# Patient Record
Sex: FEMALE | Race: WHITE | Employment: FULL TIME | URBAN - METROPOLITAN AREA
[De-identification: names, ages, dates, MRNs, and addresses within clinical notes are randomized per-mention and may not be internally consistent; named-entity substitution may affect disease eponyms.]

---

## 2020-05-26 ENCOUNTER — TELEPHONE (OUTPATIENT)
Dept: ORTHOPEDIC SURGERY | Age: 55
End: 2020-05-26

## 2020-05-26 ENCOUNTER — OFFICE VISIT (OUTPATIENT)
Dept: ORTHOPEDIC SURGERY | Age: 55
End: 2020-05-26
Payer: COMMERCIAL

## 2020-05-26 VITALS — WEIGHT: 253.09 LBS | HEIGHT: 67 IN | BODY MASS INDEX: 39.72 KG/M2

## 2020-05-26 PROCEDURE — 99203 OFFICE O/P NEW LOW 30 MIN: CPT | Performed by: PHYSICIAN ASSISTANT

## 2020-05-26 RX ORDER — METHYLPREDNISOLONE 4 MG/1
4 TABLET ORAL SEE ADMIN INSTRUCTIONS
Qty: 1 KIT | Refills: 0 | Status: SHIPPED | OUTPATIENT
Start: 2020-05-26 | End: 2020-06-01

## 2020-05-26 ASSESSMENT — ENCOUNTER SYMPTOMS
EYES NEGATIVE: 1
NAUSEA: 0
VOMITING: 0
COLOR CHANGE: 0
COUGH: 0
RHINORRHEA: 0

## 2020-05-26 NOTE — PROGRESS NOTES
Patient ID: Mars Gooden is a 53 y.o. female. Chief Complaint   Patient presents with    New Patient     Rt knee pain, started 5/21/2020. HPI  Ms. Samy Galeas is a 52 y/o female who presents for evaluation of right knee pain. Patient states her pain has been present for approximately 4 weeks however on Thursday, 5/21/2020 she can \"move the knee the wrong way \" and had a sudden increase of pain. Since then she has continued to have severe pain. Pain is most severe to the medial aspect of this right knee and is made significantly worse with any weightbearing or walking. Patient states since Thursday she has pretty much been sitting whenever she can due to pain with weightbearing. Patient denies any knee joint warmth, redness, fever or chills. Patient has tried both Advil and Tylenol without much relief. She has also tried ice rest and elevation which is provided mild relief of her pain. Past Medical History:   Diagnosis Date    Anxiety     Hypertension     Hypothyroid     Hypothyroid      No past surgical history on file. Family History   Problem Relation Age of Onset    Diabetes Mother     Cancer Mother     Heart Disease Father      Social History     Occupational History    Not on file   Tobacco Use    Smoking status: Former Smoker    Smokeless tobacco: Never Used   Substance and Sexual Activity    Alcohol use: No    Drug use: No    Sexual activity: Not on file        Review of Systems   Constitutional: Negative for chills and fever. HENT: Negative for congestion and rhinorrhea. Eyes: Negative. Respiratory: Negative for cough. Cardiovascular: Negative for chest pain and leg swelling. Gastrointestinal: Negative for nausea and vomiting. Musculoskeletal: Positive for arthralgias (Right knee). Skin: Negative for color change and rash. Neurological: Negative for dizziness and numbness.             Physical Exam  Constitutional:       Appearance: She is well-developed. HENT:      Head: Normocephalic and atraumatic. Neck:      Musculoskeletal: Normal range of motion and neck supple. Cardiovascular:      Rate and Rhythm: Normal rate. Pulmonary:      Effort: Pulmonary effort is normal.   Abdominal:      Palpations: Abdomen is soft. Skin:     General: Skin is warm and dry. Findings: No rash. Neurological:      Mental Status: She is alert and oriented to person, place, and time. Psychiatric:         Behavior: Behavior normal.     Knee: Bilateral    Skin: warm and dry, no rash or erythema  Vasculature: 2+ pedal pulses bilaterally  Neuro: Sensation grossly intact to light touch diffusely  Alignment: Normal  Tenderness: Mild tenderness to the medial aspect of the knee along the medial joint line. ROM: (Degrees)    Right   A P   Left   A P    Extension  0    Extension  0   Flexion   120    Flexion   125      Crepitation  Yes    Crepitation  Yes      Muscle strength:    Right       Left    Flexion   5    Flexion   5  Extension  5    Extension  5  SLR   5    SLR   5    Extensor lag   n    Extensor lag  n      Special testing:    Right          Left    y    Pain with deep knee flexion   n  n    Patellar grind     n  n    Patellar apprehension    n  n    Patellar glide     n    n    Lachman     n  n    Anterior drawer    n  n    Pivot shift     n  n    Posterior drawer    n  n    Dial test     n  n    Posterolateral drawer    n  n    Posterior Sag     n  Pain w/ valgus stress  MCL      n  But no laxity  n    LCL      n    y    Medial joint line tenderness   n  n    Lateral joint line tenderness   n  n    Appley's     n  n    Mcmurrey's     n          Assessment:     1. Acute pain of right knee      MDM: Differential includes flare of osteoarthritis, collateral ligament injury, cruciate ligament injury, meniscal injury, septic arthritis and fracture.   Given medial joint line pain and pain with valgus stress consider possible sprain of the MCL ligament

## 2020-06-01 NOTE — TELEPHONE ENCOUNTER
I do not see any forms done by our office, I see we wrote a letter. Will extend letter. Left message for patient that they will have to come  letter.

## 2020-06-09 ENCOUNTER — OFFICE VISIT (OUTPATIENT)
Dept: ORTHOPEDIC SURGERY | Age: 55
End: 2020-06-09
Payer: COMMERCIAL

## 2020-06-09 VITALS — HEIGHT: 67 IN | BODY MASS INDEX: 41.28 KG/M2 | WEIGHT: 263 LBS

## 2020-06-09 PROCEDURE — 99213 OFFICE O/P EST LOW 20 MIN: CPT | Performed by: PHYSICIAN ASSISTANT

## 2020-06-09 RX ORDER — DICLOFENAC SODIUM 75 MG/1
75 TABLET, DELAYED RELEASE ORAL 2 TIMES DAILY WITH MEALS
Qty: 28 TABLET | Refills: 0 | Status: SHIPPED | OUTPATIENT
Start: 2020-06-09 | End: 2020-06-30 | Stop reason: ALTCHOICE

## 2020-06-09 RX ORDER — ALPRAZOLAM 0.5 MG/1
TABLET ORAL PRN
COMMUNITY
Start: 2010-01-11

## 2020-06-09 ASSESSMENT — ENCOUNTER SYMPTOMS
RHINORRHEA: 0
COUGH: 0
NAUSEA: 0
COLOR CHANGE: 0
VOMITING: 0
EYES NEGATIVE: 1

## 2020-06-09 NOTE — PROGRESS NOTES
Patient ID: Stephanie Srinivasan is a 53 y.o. female. Chief Complaint   Patient presents with    Knee Pain     Right knee pain        HPI  Ms. Fara Clifford afebrile female returns today for reevaluation of right knee pain. Initially evaluated by me 2 weeks ago found to have pain consistent with mild MCL sprain without tear was placed in a hinged knee brace, given Medrol Dosepak and instructed to follow-up today for reevaluation. She states overall her pain has improved however she still feels that the knee is unstable with certain movements. She also has great difficulty sleeping at night due to pain when the medial aspect of the knee comes in contact with a blanket or her other leg. She states she is now able to walk short distances however she does feel unstable. She continues to ice and elevate the knee regularly. She denies any new injury, joint warmth, redness, fever or chills. Past Medical History:   Diagnosis Date    Anxiety     Hypertension     Hypothyroid     Hypothyroid      No past surgical history on file. Family History   Problem Relation Age of Onset    Diabetes Mother     Cancer Mother     Heart Disease Father      Social History     Occupational History    Not on file   Tobacco Use    Smoking status: Former Smoker    Smokeless tobacco: Never Used   Substance and Sexual Activity    Alcohol use: No    Drug use: No    Sexual activity: Not on file        Review of Systems   Constitutional: Negative for chills and fever. HENT: Negative for congestion and rhinorrhea. Eyes: Negative. Respiratory: Negative for cough. Cardiovascular: Negative for chest pain and leg swelling. Gastrointestinal: Negative for nausea and vomiting. Musculoskeletal: Positive for arthralgias (Right knee). Skin: Negative for color change and rash. Neurological: Negative for dizziness and numbness. Physical Exam  Constitutional:       Appearance: She is well-developed.    HENT: Head: Normocephalic and atraumatic. Neck:      Musculoskeletal: Normal range of motion and neck supple. Cardiovascular:      Rate and Rhythm: Normal rate. Pulmonary:      Effort: Pulmonary effort is normal.   Abdominal:      Palpations: Abdomen is soft. Skin:     General: Skin is warm and dry. Findings: No rash. Neurological:      Mental Status: She is alert and oriented to person, place, and time. Psychiatric:         Behavior: Behavior normal.     Knee: Bilateral    Skin: warm and dry, no rash or erythema  Vasculature: 2+ pedal pulses bilaterally  Neuro: Sensation grossly intact to light touch diffusely  Alignment: Normal  Tenderness: Mild tenderness over the MCL insertion. No tenderness directly over the medial joint line. No tenderness to the lateral joint. No tenderness to the anterior knee. ROM: (Degrees)    Right   A P   Left   A P    Extension  0    Extension  0   Flexion   105 115   Flexion   120      Crepitation  No    Crepitation  No      Muscle strength:    Right       Left    Flexion   5    Flexion   5  Extension  5    Extension  5  SLR   5    SLR   5    Extensor lag   n    Extensor lag  n      Special testing:    Right          Left    y    Pain with deep knee flexion   n  n    Patellar grind     n  n    Patellar apprehension    n  n    Patellar glide     n    n    Lachman     n  n    Anterior drawer    n  n    Pivot shift     n  n    Posterior drawer    n  n    Dial test     n  n    Posterolateral drawer    n  n    Posterior Sag     n  Pain w/ Valgus  Stress- no laxity  MCL      n  n    LCL      n    n    Medial joint line tenderness   n  n    Lateral joint line tenderness   n  n    Appley's     n          Assessment:     Encounter Diagnosis   Name Primary?     Sprain of medial collateral ligament of left knee, subsequent encounter Yes         Plan:     Due to patient's continued pain despite conservative management recommend MRI to rule out MCL versus medial meniscus

## 2020-06-09 NOTE — LETTER
[facility]  [address] [place] [id number]  Phone: [phone]  Fax: [phone]    Matt Zambrano        June 9, 2020     Patient: Conrado Dave   YOB: 1965   Date of Visit: 6/9/2020       To Whom It May Concern: It is my medical opinion that Conrado Dave will be off work from 5/24/2020 until released by provider. If you have any questions or concerns, please don't hesitate to call.     Sincerely,        PAPI Zambrano

## 2020-06-22 ENCOUNTER — HOSPITAL ENCOUNTER (OUTPATIENT)
Dept: MRI IMAGING | Age: 55
Discharge: HOME | End: 2020-06-24
Payer: COMMERCIAL

## 2020-06-22 PROCEDURE — 73721 MRI JNT OF LWR EXTRE W/O DYE: CPT

## 2020-06-23 ENCOUNTER — TELEPHONE (OUTPATIENT)
Dept: ORTHOPEDIC SURGERY | Age: 55
End: 2020-06-23

## 2020-06-25 ENCOUNTER — OFFICE VISIT (OUTPATIENT)
Dept: ORTHOPEDIC SURGERY | Age: 55
End: 2020-06-25
Payer: COMMERCIAL

## 2020-06-25 PROCEDURE — 99213 OFFICE O/P EST LOW 20 MIN: CPT | Performed by: ORTHOPAEDIC SURGERY

## 2020-06-25 RX ORDER — TRAMADOL HYDROCHLORIDE 50 MG/1
50 TABLET ORAL EVERY 6 HOURS PRN
Qty: 40 TABLET | Refills: 0 | Status: CANCELLED | OUTPATIENT
Start: 2020-06-25 | End: 2020-07-05

## 2020-06-25 NOTE — PROGRESS NOTES
Tashia El M.D.            [address] [place] [address], [address] [facility], [address] [facility]           Dept Phone: [phone]           Dept Fax:  [address]           [place]          Dept Phone: [phone]           Dept Fax:  [phone]      Chief Compliant:  Chief Complaint   Patient presents with    Pain     Rt MMT / MRI        History of Present Illness: This is a 53 y.o. female who presents to the clinic today for evaluation of right knee pain. Patient has been seen by our PA, Noelle Coulter with MRI of the right knee for continued pain. The MRI did confirm a medial meniscus tear with grade 1 MCL sprain as well as severe patellofemoral chondromalacia. She reports that the pain started when she went on a bike ride and the pain worsened as she started to go back to work. She notes that she did have a twisting turning motion that causes her sharp pain. She reports that she is very limited in her activity, she has trouble getting around. She notes walking has become very difficult due to pain. She is in a wheelchair today as her activities are limited. She also wears a knee brace on the right side. Review of Systems   Constitutional: Negative for fever, chills, sweats. Eyes: Negative for changes in vision, or pain. HENT: Negative for ear ache, epistaxis, or sore throat. Respiratory/Cardio: Negative for Chest pain, palpitations, SOB, or cough. Gastrointestinal: Negative for abdominal pain, N/V/D. Genitourinary: Negative for dysuria, frequency, urgency, or hematuria. Neurological: Negative for headache, numbness, or weakness. Integumentary: Negative for rash, itching, laceration, or abrasion. Musculoskeletal: Positive for Pain (Rt MMT / MRI)       Physical Exam:  Constitutional: Patient is oriented to person, place, and time.  Patient Used   Substance and Sexual Activity    Alcohol use: No    Drug use: No    Sexual activity: Not on file   Lifestyle    Physical activity     Days per week: Not on file     Minutes per session: Not on file    Stress: Not on file   Relationships    Social connections     Talks on phone: Not on file     Gets together: Not on file     Attends Sikhism service: Not on file     Active member of club or organization: Not on file     Attends meetings of clubs or organizations: Not on file     Relationship status: Not on file    Intimate partner violence     Fear of current or ex partner: Not on file     Emotionally abused: Not on file     Physically abused: Not on file     Forced sexual activity: Not on file   Other Topics Concern    Not on file   Social History Narrative    Not on file     Past Medical History:   Diagnosis Date    Anxiety     Hypertension     Hypothyroid     Hypothyroid      No past surgical history on file. Family History   Problem Relation Age of Onset    Diabetes Mother     Cancer Mother     Heart Disease Father           Scribe Attestation:  By signing my name below, I, Kyleigh Zarate, attest that this documentation has been prepared under the direction and in the presence of Dr. Waylon Hall. Electronically signed: Leif Cristobal, 6/25/20 \    Please note that this chart was generated using voice recognition Dragon dictation software. Although every effort was made to ensure the accuracy of this automated transcription, some errors in transcription may have occurred.

## 2020-06-26 ENCOUNTER — TELEPHONE (OUTPATIENT)
Dept: ORTHOPEDIC SURGERY | Age: 55
End: 2020-06-26

## 2020-06-26 RX ORDER — TRAMADOL HYDROCHLORIDE 50 MG/1
50 TABLET ORAL EVERY 6 HOURS PRN
Qty: 40 TABLET | Refills: 0 | Status: SHIPPED | OUTPATIENT
Start: 2020-06-26 | End: 2020-07-22 | Stop reason: SDUPTHER

## 2020-06-26 NOTE — TELEPHONE ENCOUNTER
Knee scope scheduled for 7/14. She is wanting to know if you might give her a pain med to cover her. Want to give her anything? Tramadol maybe?

## 2020-06-30 ENCOUNTER — HOSPITAL ENCOUNTER (OUTPATIENT)
Dept: PREADMISSION TESTING | Age: 55
Discharge: HOME | End: 2020-07-04
Payer: COMMERCIAL

## 2020-06-30 VITALS
SYSTOLIC BLOOD PRESSURE: 128 MMHG | RESPIRATION RATE: 16 BRPM | HEIGHT: 67 IN | WEIGHT: 255 LBS | HEART RATE: 74 BPM | DIASTOLIC BLOOD PRESSURE: 71 MMHG | TEMPERATURE: 97.5 F | BODY MASS INDEX: 40.02 KG/M2 | OXYGEN SATURATION: 97 %

## 2020-06-30 LAB
ABSOLUTE EOS #: 0.2 K/UL (ref 0–0.4)
ABSOLUTE IMMATURE GRANULOCYTE: NORMAL K/UL (ref 0–0.3)
ABSOLUTE LYMPH #: 1.4 K/UL (ref 1–4.8)
ABSOLUTE MONO #: 0.4 K/UL (ref 0.1–1.3)
ANION GAP SERPL CALCULATED.3IONS-SCNC: 13 MMOL/L (ref 9–17)
BASOPHILS # BLD: 1 % (ref 0–2)
BASOPHILS ABSOLUTE: 0 K/UL (ref 0–0.2)
BUN BLDV-MCNC: 15 MG/DL (ref 6–20)
BUN/CREAT BLD: ABNORMAL (ref 9–20)
CALCIUM SERPL-MCNC: 9.4 MG/DL (ref 8.6–10.4)
CHLORIDE BLD-SCNC: 103 MMOL/L (ref 98–107)
CO2: 25 MMOL/L (ref 20–31)
CREAT SERPL-MCNC: 0.7 MG/DL (ref 0.5–0.9)
DIFFERENTIAL TYPE: NORMAL
EOSINOPHILS RELATIVE PERCENT: 3 % (ref 0–4)
GFR AFRICAN AMERICAN: >60 ML/MIN
GFR NON-AFRICAN AMERICAN: >60 ML/MIN
GFR SERPL CREATININE-BSD FRML MDRD: ABNORMAL ML/MIN/{1.73_M2}
GFR SERPL CREATININE-BSD FRML MDRD: ABNORMAL ML/MIN/{1.73_M2}
GLUCOSE BLD-MCNC: 143 MG/DL (ref 70–99)
HCT VFR BLD CALC: 41.3 % (ref 36–46)
HEMOGLOBIN: 14 G/DL (ref 12–16)
IMMATURE GRANULOCYTES: NORMAL %
LYMPHOCYTES # BLD: 24 % (ref 24–44)
MCH RBC QN AUTO: 30.1 PG (ref 26–34)
MCHC RBC AUTO-ENTMCNC: 33.9 G/DL (ref 31–37)
MCV RBC AUTO: 88.8 FL (ref 80–100)
MONOCYTES # BLD: 6 % (ref 1–7)
NRBC AUTOMATED: NORMAL PER 100 WBC
PDW BLD-RTO: 12.6 % (ref 11.5–14.9)
PLATELET # BLD: 262 K/UL (ref 150–450)
PLATELET ESTIMATE: NORMAL
PMV BLD AUTO: 8.4 FL (ref 6–12)
POTASSIUM SERPL-SCNC: 4.2 MMOL/L (ref 3.7–5.3)
RBC # BLD: 4.65 M/UL (ref 4–5.2)
RBC # BLD: NORMAL 10*6/UL
SEG NEUTROPHILS: 66 % (ref 36–66)
SEGMENTED NEUTROPHILS ABSOLUTE COUNT: 4 K/UL (ref 1.3–9.1)
SODIUM BLD-SCNC: 141 MMOL/L (ref 135–144)
WBC # BLD: 6.1 K/UL (ref 3.5–11)
WBC # BLD: NORMAL 10*3/UL

## 2020-06-30 PROCEDURE — 80048 BASIC METABOLIC PNL TOTAL CA: CPT

## 2020-06-30 PROCEDURE — 85025 COMPLETE CBC W/AUTO DIFF WBC: CPT

## 2020-06-30 NOTE — H&P
HISTORY and [facility]       NAME:  Gilbert Rodríguez  MRN: [id number]   YOB: 1965   Date: 6/30/2020   Age: 53 y.o. Gender: female       COMPLAINT AND PRESENT HISTORY:     Gilbert Rodríguez is 53 y.o.,  female, undergoing preadmission testing for right knee meniscus tear with scheduled right knee arthroscopy partial medial meniscectomy. Symptoms started 1 month ago. Pt was riding bike and started having mild pain in right knee. Got up to bathroom shortly after right knee initially began hurting, twisted and felt sudden, severe pain. Pt unable to walk at that time due to pain. Pt is able to walk a few steps now with assistance from walker at home. Wearing a brace today. In wheelchair today. Pt c/o right knee pain. Describes pain as intermittent sharp pain. Rating pain 7-10/10. Takes Voltaren, tramadol and advil with moderate relief. Standing makes pain worse. Ice, rest and elevation helps alleviate pain. Pain is worse at night, wakes her from sleep. Worse first thing in the morning as well. The knee does not buckle, give way under the patient. No recent falls or trauma. No redness, swelling or rashes. No Hx of MRSA infections in the past.    PMHx HTN, well controlled with losartan. Denies any recent headaches, dizziness, chest pain/pressure, palpitations, SOB, recent URI, fever or chills. PAST MEDICAL HISTORY     Past Medical History:   Diagnosis Date    Anxiety     Hypertension     Hypothyroid     Hypothyroid      Pt denies any history of Diabetes mellitus type 2, stroke, heart disease, COPD, Asthma, GERD, HLD, Cancer, Seizures, Kidney Disease, Hepatitis, TB, Psychiatric Disorders or Substance abuse.     SURGICAL HISTORY       Past Surgical History:   Procedure Laterality Date    COLONOSCOPY      TONSILLECTOMY      WISDOM TOOTH EXTRACTION         FAMILY HISTORY       Family History   Problem Relation Age of Onset    Diabetes Mother     Cancer Mother    [employer] Heart Disease Father        SOCIAL HISTORY       Social History     Socioeconomic History    Marital status:      Spouse name: None    Number of children: None    Years of education: None    Highest education level: None   Occupational History    None   Social Needs    Financial resource strain: None    Food insecurity     Worry: None     Inability: None    Transportation needs     Medical: None     Non-medical: None   Tobacco Use    Smoking status: Former Smoker     Last attempt to quit: 1992     Years since quittin.0    Smokeless tobacco: Never Used   Substance and Sexual Activity    Alcohol use: No    Drug use: No    Sexual activity: None   Lifestyle    Physical activity     Days per week: None     Minutes per session: None    Stress: None   Relationships    Social connections     Talks on phone: None     Gets together: None     Attends Lutheran service: None     Active member of club or organization: None     Attends meetings of clubs or organizations: None     Relationship status: None    Intimate partner violence     Fear of current or ex partner: None     Emotionally abused: None     Physically abused: None     Forced sexual activity: None   Other Topics Concern    None   Social History Narrative    None        REVIEW OF SYSTEMS      Allergies   Allergen Reactions    Pcn [Penicillins]     Penicillins Other (See Comments)     Pt says mom said PT passed out       Current Outpatient Medications on File Prior to Encounter   Medication Sig Dispense Refill    traMADol (ULTRAM) 50 MG tablet Take 1 tablet by mouth every 6 hours as needed for Pain for up to 10 days. Intended supply: 5 days. Take lowest dose possible to manage pain 40 tablet 0    ALPRAZolam (XANAX) 0.5 MG tablet Take by mouth as needed.       levothyroxine (SYNTHROID) 75 MCG tablet Take 1 tablet by mouth daily 90 tablet 3    losartan (COZAAR) 100 MG tablet Take 1 tablet by mouth daily (Patient taking differently: Take 100 mg by mouth nightly ) 90 tablet 3    PARoxetine (PAXIL) 20 MG tablet Take 1 tablet by mouth every morning Indications: 1 qd 90 tablet 3     No current facility-administered medications on file prior to encounter. General health:  Fairly good. No fever or chills. Skin:  No itching, redness or rash. HEENT:  No headache, epistaxis or sore throat. Neck:  No pain, stiffness or masses. Cardiovascular/Respiratory system:  No chest pain, palpitation or shortness of breath. Gastrointestinal tract: No abdominal pain, Dysphagia, nausea, vomiting, diarrhea or constipation. Genitourinary:  No burning on micturition. No hesitancy, urgency, frequency or discoloration of urine. Locomotor:  See HPI. Neuropsychiatric:  No referable complaints. GENERAL PHYSICAL EXAM:     Vitals: /71   Pulse 74   Temp 97.5 °F (36.4 °C) (Infrared)   Resp 16   Ht 5' 7\" (1.702 m)   Wt 255 lb (115.7 kg)   SpO2 97%   BMI 39.94 kg/m²  Body mass index is 39.94 kg/m². GENERAL APPEARANCE:   [place] Ever is 53 y.o.,  female, moderately obese, nourished, conscious, alert. Does not appear to be in any distress or pain at this time. SKIN:  Warm, dry, no cyanosis or jaundice. HEAD:  Normocephalic, atraumatic. EYES:  Pupils equal, reactive to light. EARS:  No discharge, no marked hearing loss. NOSE:  No rhinorrhea, epistaxis or septal deformity. THROAT:  Mucus membranes moist, no erythema or exudate. NECK:  No stiffness, trachea central.  No palpable masses or L.N.                 CHEST:  Symmetrical and equal on expansion. HEART:  RRR S1 > S2. No audible murmurs or gallops.                  LUNGS:  Equal on expansion, normal breath sounds. No wheezing, rhonchi or rales. ABDOMEN:  NABS x 4 quads. Soft on palpation. No localized tenderness. No guarding or rigidity. LYMPHATICS:  No palpable cervical lymphadenopathy. LOCOMOTOR, BACK AND SPINE:  No tenderness or deformities. EXTREMITIES:  Tenderness on palpation of the right knee joint space worse on the medial aspect. Symmetrical, no pretibial edema. No calf tenderness. No discoloration or ulcerations. NEUROLOGIC:  The patient is conscious, alert, oriented. Speech clear, no facial drooping. No apparent focal sensory or motor deficits.                                                                                      PROVISIONAL DIAGNOSES / SURGERY:      MENISCUS TEAR    KNEE ARTHROSCOPY PARTIAL MEDIAL MENISECECTOMY    Patient Active Problem List    Diagnosis Date Noted    Hyperlipidemia 12/01/2014    Hypertension     Hypothyroid     Anxiety            ANGELLA Salas CNP on 6/30/2020 at 10:29 AM

## 2020-06-30 NOTE — FLOWSHEET NOTE
Reviewed patients history and recent EKG with Dr. Letha Dunbar, anesthesia.   No clearance required per Dr. Jose Hale.

## 2020-06-30 NOTE — H&P (VIEW-ONLY)
HISTORY and [id number]       NAME:  Imani Monique  MRN: [id number]   YOB: 1965   Date: 6/30/2020   Age: 53 y.o. Gender: female       COMPLAINT AND PRESENT HISTORY:     Imani Monique is 53 y.o.,  female, undergoing preadmission testing for right knee meniscus tear with scheduled right knee arthroscopy partial medial meniscectomy. Symptoms started 1 month ago. Pt was riding bike and started having mild pain in right knee. Got up to bathroom shortly after right knee initially began hurting, twisted and felt sudden, severe pain. Pt unable to walk at that time due to pain. Pt is able to walk a few steps now with assistance from walker at home. Wearing a brace today. In wheelchair today. Pt c/o right knee pain. Describes pain as intermittent sharp pain. Rating pain 7-10/10. Takes Voltaren, tramadol and advil with moderate relief. Standing makes pain worse. Ice, rest and elevation helps alleviate pain. Pain is worse at night, wakes her from sleep. Worse first thing in the morning as well. The knee does not buckle, give way under the patient. No recent falls or trauma. No redness, swelling or rashes. No Hx of MRSA infections in the past.    PMHx HTN, well controlled with losartan. Denies any recent headaches, dizziness, chest pain/pressure, palpitations, SOB, recent URI, fever or chills. PAST MEDICAL HISTORY     Past Medical History:   Diagnosis Date    Anxiety     Hypertension     Hypothyroid     Hypothyroid      Pt denies any history of Diabetes mellitus type 2, stroke, heart disease, COPD, Asthma, GERD, HLD, Cancer, Seizures, Kidney Disease, Hepatitis, TB, Psychiatric Disorders or Substance abuse.     SURGICAL HISTORY       Past Surgical History:   Procedure Laterality Date    COLONOSCOPY      TONSILLECTOMY      WISDOM TOOTH EXTRACTION         FAMILY HISTORY       Family History   Problem Relation Age of Onset    Diabetes Mother     Cancer Mother    Edy Piper Heart Disease Father        SOCIAL HISTORY       Social History     Socioeconomic History    Marital status:      Spouse name: None    Number of children: None    Years of education: None    Highest education level: None   Occupational History    None   Social Needs    Financial resource strain: None    Food insecurity     Worry: None     Inability: None    Transportation needs     Medical: None     Non-medical: None   Tobacco Use    Smoking status: Former Smoker     Last attempt to quit: 1992     Years since quittin.0    Smokeless tobacco: Never Used   Substance and Sexual Activity    Alcohol use: No    Drug use: No    Sexual activity: None   Lifestyle    Physical activity     Days per week: None     Minutes per session: None    Stress: None   Relationships    Social connections     Talks on phone: None     Gets together: None     Attends Scientologist service: None     Active member of club or organization: None     Attends meetings of clubs or organizations: None     Relationship status: None    Intimate partner violence     Fear of current or ex partner: None     Emotionally abused: None     Physically abused: None     Forced sexual activity: None   Other Topics Concern    None   Social History Narrative    None        REVIEW OF SYSTEMS      Allergies   Allergen Reactions    Pcn [Penicillins]     Penicillins Other (See Comments)     Pt says mom said PT passed out       Current Outpatient Medications on File Prior to Encounter   Medication Sig Dispense Refill    traMADol (ULTRAM) 50 MG tablet Take 1 tablet by mouth every 6 hours as needed for Pain for up to 10 days. Intended supply: 5 days. Take lowest dose possible to manage pain 40 tablet 0    ALPRAZolam (XANAX) 0.5 MG tablet Take by mouth as needed.       levothyroxine (SYNTHROID) 75 MCG tablet Take 1 tablet by mouth daily 90 tablet 3    losartan (COZAAR) 100 MG tablet Take 1 tablet by mouth daily (Patient taking differently: Take 100 mg by mouth nightly ) 90 tablet 3    PARoxetine (PAXIL) 20 MG tablet Take 1 tablet by mouth every morning Indications: 1 qd 90 tablet 3     No current facility-administered medications on file prior to encounter. General health:  Fairly good. No fever or chills. Skin:  No itching, redness or rash. HEENT:  No headache, epistaxis or sore throat. Neck:  No pain, stiffness or masses. Cardiovascular/Respiratory system:  No chest pain, palpitation or shortness of breath. Gastrointestinal tract: No abdominal pain, Dysphagia, nausea, vomiting, diarrhea or constipation. Genitourinary:  No burning on micturition. No hesitancy, urgency, frequency or discoloration of urine. Locomotor:  See HPI. Neuropsychiatric:  No referable complaints. GENERAL PHYSICAL EXAM:     Vitals: /71   Pulse 74   Temp 97.5 °F (36.4 °C) (Infrared)   Resp 16   Ht 5' 7\" (1.702 m)   Wt 255 lb (115.7 kg)   SpO2 97%   BMI 39.94 kg/m²  Body mass index is 39.94 kg/m². GENERAL APPEARANCE:   Heike Wright is 53 y.o.,  female, moderately obese, nourished, conscious, alert. Does not appear to be in any distress or pain at this time. SKIN:  Warm, dry, no cyanosis or jaundice. HEAD:  Normocephalic, atraumatic. EYES:  Pupils equal, reactive to light. EARS:  No discharge, no marked hearing loss. NOSE:  No rhinorrhea, epistaxis or septal deformity. THROAT:  Mucus membranes moist, no erythema or exudate. NECK:  No stiffness, trachea central.  No palpable masses or L.N.                 CHEST:  Symmetrical and equal on expansion. HEART:  RRR S1 > S2. No audible murmurs or gallops.                  LUNGS:  Equal on expansion, normal breath sounds. No wheezing, rhonchi or rales. ABDOMEN:  NABS x 4 quads. Soft on palpation. No localized tenderness. No guarding or rigidity. LYMPHATICS:  No palpable cervical lymphadenopathy. LOCOMOTOR, BACK AND SPINE:  No tenderness or deformities. EXTREMITIES:  Tenderness on palpation of the right knee joint space worse on the medial aspect. Symmetrical, no pretibial edema. No calf tenderness. No discoloration or ulcerations. NEUROLOGIC:  The patient is conscious, alert, oriented. Speech clear, no facial drooping. No apparent focal sensory or motor deficits.                                                                                      PROVISIONAL DIAGNOSES / SURGERY:      MENISCUS TEAR    KNEE ARTHROSCOPY PARTIAL MEDIAL MENISECECTOMY    Patient Active Problem List    Diagnosis Date Noted    Hyperlipidemia 12/01/2014    Hypertension     Hypothyroid     Anxiety            ANGELLA Salas CNP on 6/30/2020 at 10:29 AM

## 2020-07-01 ENCOUNTER — ANESTHESIA EVENT (OUTPATIENT)
Dept: OPERATING ROOM | Age: 55
End: 2020-07-01
Payer: COMMERCIAL

## 2020-07-01 ENCOUNTER — TELEPHONE (OUTPATIENT)
Dept: ORTHOPEDIC SURGERY | Age: 55
End: 2020-07-01

## 2020-07-01 LAB
EKG ATRIAL RATE: 67 BPM
EKG P AXIS: 55 DEGREES
EKG P-R INTERVAL: 164 MS
EKG Q-T INTERVAL: 402 MS
EKG QRS DURATION: 96 MS
EKG QTC CALCULATION (BAZETT): 424 MS
EKG R AXIS: 64 DEGREES
EKG T AXIS: 34 DEGREES
EKG VENTRICULAR RATE: 67 BPM

## 2020-07-01 RX ORDER — SODIUM CHLORIDE 0.9 % (FLUSH) 0.9 %
10 SYRINGE (ML) INJECTION PRN
Status: CANCELLED | OUTPATIENT
Start: 2020-07-01

## 2020-07-01 RX ORDER — SODIUM CHLORIDE, SODIUM LACTATE, POTASSIUM CHLORIDE, CALCIUM CHLORIDE 600; 310; 30; 20 MG/100ML; MG/100ML; MG/100ML; MG/100ML
INJECTION, SOLUTION INTRAVENOUS CONTINUOUS
Status: CANCELLED | OUTPATIENT
Start: 2020-07-01

## 2020-07-01 RX ORDER — SODIUM CHLORIDE 0.9 % (FLUSH) 0.9 %
10 SYRINGE (ML) INJECTION EVERY 12 HOURS SCHEDULED
Status: CANCELLED | OUTPATIENT
Start: 2020-07-01

## 2020-07-01 RX ORDER — LIDOCAINE HYDROCHLORIDE 10 MG/ML
1 INJECTION, SOLUTION EPIDURAL; INFILTRATION; INTRACAUDAL; PERINEURAL
Status: CANCELLED | OUTPATIENT
Start: 2020-07-01 | End: 2020-07-01

## 2020-07-01 NOTE — TELEPHONE ENCOUNTER
Patient's employer is calling to see if we extended the patient's time off work. The paperwork we filled out for return to work of 6/24/20. I see she is scheduled for surgery 7/14/20. Are you keeping the patient off until the surgery? Her employer is sending over more paperwork to be filled out, if we are extending her.

## 2020-07-10 ENCOUNTER — HOSPITAL ENCOUNTER (OUTPATIENT)
Dept: PREADMISSION TESTING | Age: 55
Setting detail: SPECIMEN
Discharge: HOME | End: 2020-07-14
Payer: COMMERCIAL

## 2020-07-10 PROCEDURE — U0004 COV-19 TEST NON-CDC HGH THRU: HCPCS

## 2020-07-11 LAB
SARS-COV-2, PCR: NOT DETECTED
SARS-COV-2, RAPID: NORMAL
SARS-COV-2: NORMAL
SOURCE: NORMAL

## 2020-07-12 ENCOUNTER — TELEPHONE (OUTPATIENT)
Dept: PRIMARY CARE CLINIC | Age: 55
End: 2020-07-12

## 2020-07-14 ENCOUNTER — ANESTHESIA (OUTPATIENT)
Dept: OPERATING ROOM | Age: 55
End: 2020-07-14
Payer: COMMERCIAL

## 2020-07-14 ENCOUNTER — HOSPITAL ENCOUNTER (OUTPATIENT)
Age: 55
Setting detail: OUTPATIENT SURGERY
Discharge: HOME | End: 2020-07-14
Attending: ORTHOPAEDIC SURGERY | Admitting: ORTHOPAEDIC SURGERY
Payer: COMMERCIAL

## 2020-07-14 VITALS
SYSTOLIC BLOOD PRESSURE: 130 MMHG | OXYGEN SATURATION: 97 % | TEMPERATURE: 97.7 F | BODY MASS INDEX: 39.71 KG/M2 | HEART RATE: 77 BPM | HEIGHT: 67 IN | RESPIRATION RATE: 16 BRPM | WEIGHT: 253 LBS | DIASTOLIC BLOOD PRESSURE: 79 MMHG

## 2020-07-14 VITALS — TEMPERATURE: 96.6 F | SYSTOLIC BLOOD PRESSURE: 113 MMHG | OXYGEN SATURATION: 96 % | DIASTOLIC BLOOD PRESSURE: 57 MMHG

## 2020-07-14 PROCEDURE — 6360000002 HC RX W HCPCS: Performed by: ORTHOPAEDIC SURGERY

## 2020-07-14 PROCEDURE — 2500000003 HC RX 250 WO HCPCS: Performed by: NURSE ANESTHETIST, CERTIFIED REGISTERED

## 2020-07-14 PROCEDURE — 7100000010 HC PHASE II RECOVERY - FIRST 15 MIN: Performed by: ORTHOPAEDIC SURGERY

## 2020-07-14 PROCEDURE — 2709999900 HC NON-CHARGEABLE SUPPLY: Performed by: ORTHOPAEDIC SURGERY

## 2020-07-14 PROCEDURE — 7100000011 HC PHASE II RECOVERY - ADDTL 15 MIN: Performed by: ORTHOPAEDIC SURGERY

## 2020-07-14 PROCEDURE — 7100000000 HC PACU RECOVERY - FIRST 15 MIN: Performed by: ORTHOPAEDIC SURGERY

## 2020-07-14 PROCEDURE — 6360000002 HC RX W HCPCS: Performed by: ANESTHESIOLOGY

## 2020-07-14 PROCEDURE — 2580000003 HC RX 258: Performed by: ANESTHESIOLOGY

## 2020-07-14 PROCEDURE — 7100000030 HC ASPR PHASE II RECOVERY - FIRST 15 MIN: Performed by: ORTHOPAEDIC SURGERY

## 2020-07-14 PROCEDURE — 3600000013 HC SURGERY LEVEL 3 ADDTL 15MIN: Performed by: ORTHOPAEDIC SURGERY

## 2020-07-14 PROCEDURE — 29881 ARTHRS KNE SRG MNISECTMY M/L: CPT | Performed by: ORTHOPAEDIC SURGERY

## 2020-07-14 PROCEDURE — 7100000001 HC PACU RECOVERY - ADDTL 15 MIN: Performed by: ORTHOPAEDIC SURGERY

## 2020-07-14 PROCEDURE — 3600000003 HC SURGERY LEVEL 3 BASE: Performed by: ORTHOPAEDIC SURGERY

## 2020-07-14 PROCEDURE — 3700000001 HC ADD 15 MINUTES (ANESTHESIA): Performed by: ORTHOPAEDIC SURGERY

## 2020-07-14 PROCEDURE — 6360000002 HC RX W HCPCS: Performed by: NURSE ANESTHETIST, CERTIFIED REGISTERED

## 2020-07-14 PROCEDURE — 7100000031 HC ASPR PHASE II RECOVERY - ADDTL 15 MIN: Performed by: ORTHOPAEDIC SURGERY

## 2020-07-14 PROCEDURE — 3700000000 HC ANESTHESIA ATTENDED CARE: Performed by: ORTHOPAEDIC SURGERY

## 2020-07-14 RX ORDER — ONDANSETRON 2 MG/ML
INJECTION INTRAMUSCULAR; INTRAVENOUS PRN
Status: DISCONTINUED | OUTPATIENT
Start: 2020-07-14 | End: 2020-07-14 | Stop reason: SDUPTHER

## 2020-07-14 RX ORDER — ONDANSETRON 2 MG/ML
4 INJECTION INTRAMUSCULAR; INTRAVENOUS
Status: DISCONTINUED | OUTPATIENT
Start: 2020-07-14 | End: 2020-07-14 | Stop reason: HOSPADM

## 2020-07-14 RX ORDER — HYDROCODONE BITARTRATE AND ACETAMINOPHEN 5; 325 MG/1; MG/1
1 TABLET ORAL EVERY 6 HOURS PRN
Qty: 20 TABLET | Refills: 0 | Status: SHIPPED | OUTPATIENT
Start: 2020-07-14 | End: 2020-07-21

## 2020-07-14 RX ORDER — SODIUM CHLORIDE, SODIUM LACTATE, POTASSIUM CHLORIDE, CALCIUM CHLORIDE 600; 310; 30; 20 MG/100ML; MG/100ML; MG/100ML; MG/100ML
125 INJECTION, SOLUTION INTRAVENOUS CONTINUOUS
Status: DISCONTINUED | OUTPATIENT
Start: 2020-07-14 | End: 2020-07-14 | Stop reason: HOSPADM

## 2020-07-14 RX ORDER — SODIUM CHLORIDE 0.9 % (FLUSH) 0.9 %
10 SYRINGE (ML) INJECTION EVERY 12 HOURS SCHEDULED
Status: DISCONTINUED | OUTPATIENT
Start: 2020-07-14 | End: 2020-07-14 | Stop reason: HOSPADM

## 2020-07-14 RX ORDER — LIDOCAINE HYDROCHLORIDE 10 MG/ML
INJECTION, SOLUTION EPIDURAL; INFILTRATION; INTRACAUDAL; PERINEURAL PRN
Status: DISCONTINUED | OUTPATIENT
Start: 2020-07-14 | End: 2020-07-14 | Stop reason: SDUPTHER

## 2020-07-14 RX ORDER — MEPERIDINE HYDROCHLORIDE 25 MG/ML
12.5 INJECTION INTRAMUSCULAR; INTRAVENOUS; SUBCUTANEOUS EVERY 5 MIN PRN
Status: DISCONTINUED | OUTPATIENT
Start: 2020-07-14 | End: 2020-07-14 | Stop reason: HOSPADM

## 2020-07-14 RX ORDER — MORPHINE SULFATE 2 MG/ML
2 INJECTION, SOLUTION INTRAMUSCULAR; INTRAVENOUS EVERY 5 MIN PRN
Status: DISCONTINUED | OUTPATIENT
Start: 2020-07-14 | End: 2020-07-14 | Stop reason: HOSPADM

## 2020-07-14 RX ORDER — DIPHENHYDRAMINE HYDROCHLORIDE 50 MG/ML
12.5 INJECTION INTRAMUSCULAR; INTRAVENOUS
Status: COMPLETED | OUTPATIENT
Start: 2020-07-14 | End: 2020-07-14

## 2020-07-14 RX ORDER — DEXAMETHASONE SODIUM PHOSPHATE 4 MG/ML
INJECTION, SOLUTION INTRA-ARTICULAR; INTRALESIONAL; INTRAMUSCULAR; INTRAVENOUS; SOFT TISSUE PRN
Status: DISCONTINUED | OUTPATIENT
Start: 2020-07-14 | End: 2020-07-14 | Stop reason: SDUPTHER

## 2020-07-14 RX ORDER — PROPOFOL 10 MG/ML
INJECTION, EMULSION INTRAVENOUS PRN
Status: DISCONTINUED | OUTPATIENT
Start: 2020-07-14 | End: 2020-07-14 | Stop reason: SDUPTHER

## 2020-07-14 RX ORDER — LABETALOL 20 MG/4 ML (5 MG/ML) INTRAVENOUS SYRINGE
5 EVERY 10 MIN PRN
Status: DISCONTINUED | OUTPATIENT
Start: 2020-07-14 | End: 2020-07-14 | Stop reason: HOSPADM

## 2020-07-14 RX ORDER — LIDOCAINE HYDROCHLORIDE 10 MG/ML
1 INJECTION, SOLUTION EPIDURAL; INFILTRATION; INTRACAUDAL; PERINEURAL
Status: DISCONTINUED | OUTPATIENT
Start: 2020-07-14 | End: 2020-07-14 | Stop reason: HOSPADM

## 2020-07-14 RX ORDER — ROPIVACAINE HYDROCHLORIDE 5 MG/ML
INJECTION, SOLUTION EPIDURAL; INFILTRATION; PERINEURAL PRN
Status: DISCONTINUED | OUTPATIENT
Start: 2020-07-14 | End: 2020-07-14 | Stop reason: ALTCHOICE

## 2020-07-14 RX ORDER — KETOROLAC TROMETHAMINE 30 MG/ML
INJECTION, SOLUTION INTRAMUSCULAR; INTRAVENOUS PRN
Status: DISCONTINUED | OUTPATIENT
Start: 2020-07-14 | End: 2020-07-14 | Stop reason: SDUPTHER

## 2020-07-14 RX ORDER — MORPHINE SULFATE 2 MG/ML
2 INJECTION, SOLUTION INTRAMUSCULAR; INTRAVENOUS EVERY 4 HOURS PRN
Status: DISCONTINUED | OUTPATIENT
Start: 2020-07-14 | End: 2020-07-14 | Stop reason: HOSPADM

## 2020-07-14 RX ORDER — FENTANYL CITRATE 50 UG/ML
INJECTION, SOLUTION INTRAMUSCULAR; INTRAVENOUS PRN
Status: DISCONTINUED | OUTPATIENT
Start: 2020-07-14 | End: 2020-07-14 | Stop reason: SDUPTHER

## 2020-07-14 RX ORDER — ACETAMINOPHEN 500 MG
1000 TABLET ORAL EVERY 6 HOURS PRN
COMMUNITY

## 2020-07-14 RX ORDER — MIDAZOLAM HYDROCHLORIDE 1 MG/ML
INJECTION INTRAMUSCULAR; INTRAVENOUS PRN
Status: DISCONTINUED | OUTPATIENT
Start: 2020-07-14 | End: 2020-07-14 | Stop reason: SDUPTHER

## 2020-07-14 RX ORDER — SODIUM CHLORIDE 0.9 % (FLUSH) 0.9 %
10 SYRINGE (ML) INJECTION PRN
Status: DISCONTINUED | OUTPATIENT
Start: 2020-07-14 | End: 2020-07-14 | Stop reason: HOSPADM

## 2020-07-14 RX ADMIN — DEXAMETHASONE SODIUM PHOSPHATE 4 MG: 4 INJECTION, SOLUTION INTRA-ARTICULAR; INTRALESIONAL; INTRAMUSCULAR; INTRAVENOUS; SOFT TISSUE at 12:34

## 2020-07-14 RX ADMIN — PROPOFOL 200 MG: 10 INJECTION, EMULSION INTRAVENOUS at 12:31

## 2020-07-14 RX ADMIN — SODIUM CHLORIDE, POTASSIUM CHLORIDE, SODIUM LACTATE AND CALCIUM CHLORIDE 125 ML/HR: 600; 310; 30; 20 INJECTION, SOLUTION INTRAVENOUS at 11:41

## 2020-07-14 RX ADMIN — LIDOCAINE HYDROCHLORIDE 50 MG: 10 INJECTION, SOLUTION EPIDURAL; INFILTRATION; INTRACAUDAL; PERINEURAL at 12:31

## 2020-07-14 RX ADMIN — DIPHENHYDRAMINE HYDROCHLORIDE 12.5 MG: 50 INJECTION INTRAMUSCULAR; INTRAVENOUS at 13:29

## 2020-07-14 RX ADMIN — MIDAZOLAM 2 MG: 1 INJECTION INTRAMUSCULAR; INTRAVENOUS at 12:27

## 2020-07-14 RX ADMIN — FENTANYL CITRATE 25 MCG: 50 INJECTION, SOLUTION INTRAMUSCULAR; INTRAVENOUS at 12:40

## 2020-07-14 RX ADMIN — SODIUM CHLORIDE, POTASSIUM CHLORIDE, SODIUM LACTATE AND CALCIUM CHLORIDE: 600; 310; 30; 20 INJECTION, SOLUTION INTRAVENOUS at 12:07

## 2020-07-14 RX ADMIN — ONDANSETRON 4 MG: 2 INJECTION INTRAMUSCULAR; INTRAVENOUS at 12:34

## 2020-07-14 RX ADMIN — FENTANYL CITRATE 50 MCG: 50 INJECTION, SOLUTION INTRAMUSCULAR; INTRAVENOUS at 12:45

## 2020-07-14 RX ADMIN — MORPHINE SULFATE 2 MG: 2 INJECTION, SOLUTION INTRAMUSCULAR; INTRAVENOUS at 12:09

## 2020-07-14 RX ADMIN — FENTANYL CITRATE 25 MCG: 50 INJECTION, SOLUTION INTRAMUSCULAR; INTRAVENOUS at 12:52

## 2020-07-14 RX ADMIN — KETOROLAC TROMETHAMINE 30 MG: 30 INJECTION, SOLUTION INTRAMUSCULAR; INTRAVENOUS at 13:05

## 2020-07-14 ASSESSMENT — PULMONARY FUNCTION TESTS
PIF_VALUE: 2
PIF_VALUE: 2
PIF_VALUE: 1
PIF_VALUE: 2
PIF_VALUE: 21
PIF_VALUE: 2
PIF_VALUE: 2
PIF_VALUE: 3
PIF_VALUE: 1
PIF_VALUE: 2
PIF_VALUE: 6
PIF_VALUE: 1
PIF_VALUE: 18
PIF_VALUE: 2
PIF_VALUE: 0
PIF_VALUE: 2
PIF_VALUE: 2
PIF_VALUE: 3
PIF_VALUE: 2
PIF_VALUE: 14
PIF_VALUE: 2
PIF_VALUE: 2
PIF_VALUE: 1
PIF_VALUE: 8
PIF_VALUE: 3
PIF_VALUE: 1
PIF_VALUE: 1
PIF_VALUE: 2
PIF_VALUE: 0
PIF_VALUE: 2
PIF_VALUE: 1
PIF_VALUE: 5
PIF_VALUE: 2
PIF_VALUE: 1
PIF_VALUE: 2
PIF_VALUE: 3
PIF_VALUE: 2
PIF_VALUE: 2

## 2020-07-14 ASSESSMENT — LIFESTYLE VARIABLES: SMOKING_STATUS: 0

## 2020-07-14 ASSESSMENT — PAIN DESCRIPTION - DESCRIPTORS: DESCRIPTORS: ACHING;SHOOTING;SHARP

## 2020-07-14 ASSESSMENT — ENCOUNTER SYMPTOMS
SHORTNESS OF BREATH: 0
STRIDOR: 0

## 2020-07-14 ASSESSMENT — PAIN - FUNCTIONAL ASSESSMENT: PAIN_FUNCTIONAL_ASSESSMENT: 0-10

## 2020-07-14 ASSESSMENT — PAIN DESCRIPTION - ORIENTATION: ORIENTATION: RIGHT

## 2020-07-14 ASSESSMENT — PAIN DESCRIPTION - LOCATION: LOCATION: KNEE

## 2020-07-14 ASSESSMENT — PAIN DESCRIPTION - PAIN TYPE: TYPE: SURGICAL PAIN

## 2020-07-14 ASSESSMENT — PAIN SCALES - GENERAL
PAINLEVEL_OUTOF10: 7
PAINLEVEL_OUTOF10: 5
PAINLEVEL_OUTOF10: 5

## 2020-07-14 NOTE — ANESTHESIA PRE PROCEDURE
Department of Anesthesiology  Preprocedure Note       Name:  Mariella Myles   Age:  53 y.o.  :  1965                                          MRN:  [id number]         Date:  2020      Surgeon: Lanny Hardy):  Cristal Oneil MD    Procedure: Procedure(s):  KNEE ARTHROSCOPY PARTIAL MEDIAL MENISECECTOMY    Medications prior to admission:   Prior to Admission medications    Medication Sig Start Date End Date Taking? Authorizing Provider   HYDROcodone-acetaminophen (NORCO) 5-325 MG per tablet Take 1 tablet by mouth every 6 hours as needed for Pain for up to 7 days. Intended supply: 5 days. Take lowest dose possible to manage pain 20 Yes Cristal Oneil MD   ALPRAZolam [facility]) 0.5 MG tablet Take by mouth as needed. 1/11/10   Historical Provider, MD   levothyroxine (SYNTHROID) 75 MCG tablet Take 1 tablet by mouth daily 12/22/15 6/30/20  Socorro Rivas PA-C   losartan (COZAAR) 100 MG tablet Take 1 tablet by mouth daily  Patient taking differently: Take 100 mg by mouth nightly  12/22/15 6/30/20  Socorro Rivas PA-C   PARoxetine (PAXIL) 20 MG tablet Take 1 tablet by mouth every morning Indications: 1 qd 12/22/15 6/30/20  Socorro Rivas PA-C       Current medications:    Current Facility-Administered Medications   Medication Dose Route Frequency Provider Last Rate Last Dose    lactated ringers infusion  125 mL/hr Intravenous Continuous Wild Garay MD        lidocaine PF 1 % injection 1 mL  1 mL Intradermal Once PRN Alexis Ravi MD        sodium chloride flush 0.9 % injection 10 mL  10 mL Intravenous 2 times per day Alexis Ravi MD        sodium chloride flush 0.9 % injection 10 mL  10 mL Intravenous PRN Alexis Ravi MD           Allergies:     Allergies   Allergen Reactions    Pcn [Penicillins]     Penicillins Other (See Comments)     Pt says mom said PT passed out       Problem List:    Patient Active Problem List   Diagnosis Code    Hypertension I10    Hypothyroid E03.9    Anxiety F41.9  Hyperlipidemia E78.5       Past Medical History:        Diagnosis Date    Anxiety     Hypertension     Hypothyroid     Hypothyroid        Past Surgical History:        Procedure Laterality Date    COLONOSCOPY      TONSILLECTOMY      WISDOM TOOTH EXTRACTION         Social History:    Social History     Tobacco Use    Smoking status: Former Smoker     Last attempt to quit: 1992     Years since quittin.0    Smokeless tobacco: Never Used   Substance Use Topics    Alcohol use: No                                Counseling given: Not Answered      Vital Signs (Current): There were no vitals filed for this visit. BP Readings from Last 3 Encounters:   20 128/71   12/22/15 128/82   14 130/80       NPO Status:                                                                                 BMI:   Wt Readings from Last 3 Encounters:   20 255 lb (115.7 kg)   20 263 lb (119.3 kg)   20 253 lb 1.4 oz (114.8 kg)     There is no height or weight on file to calculate BMI.    CBC:   Lab Results   Component Value Date    WBC 6.1 2020    RBC 4.65 2020    HGB 14.0 2020    HCT 41.3 2020    MCV 88.8 2020    RDW 12.6 2020     2020       CMP:   Lab Results   Component Value Date     2020    K 4.2 2020     2020    CO2 25 2020    BUN 15 2020    CREATININE 0.70 2020    GFRAA >60 2020    LABGLOM >60 2020    GLUCOSE 143 2020    PROT 6.8 2014    CALCIUM 9.4 2020    BILITOT 1.2 2014    ALKPHOS 59 2014    AST 21 2014    ALT 19 2014       POC Tests: No results for input(s): POCGLU, POCNA, POCK, POCCL, POCBUN, POCHEMO, POCHCT in the last 72 hours.     Coags: No results found for: PROTIME, INR, APTT    HCG (If Applicable): No results found for: PREGTESTUR, PREGSERUM, HCG, HCGQUANT     ABGs: No results found for: PHART, PO2ART, SYH5CNU, GWG6VDT, BEART, B9HYEJHU     Type & Screen (If Applicable):  No results found for: LABABO, LABRH    Drug/Infectious Status (If Applicable):  No results found for: HIV, HEPCAB    COVID-19 Screening (If Applicable):   Lab Results   Component Value Date    COVID19 Not Detected 07/10/2020         Anesthesia Evaluation  Patient summary reviewed no history of anesthetic complications:   Airway: Mallampati: II  TM distance: >3 FB   Neck ROM: full  Mouth opening: > = 3 FB Dental: normal exam         Pulmonary:Negative Pulmonary ROS and normal exam  breath sounds clear to auscultation      (-) pneumonia, COPD, asthma, shortness of breath, recent URI, sleep apnea, rhonchi, wheezes, rales, stridor and not a current smoker          Patient did not smoke on day of surgery. Cardiovascular:  Exercise tolerance: good (>4 METS),   (+) hypertension: no interval change,     (-) pacemaker, valvular problems/murmurs, past MI, CAD, CABG/stent, dysrhythmias,  angina,  CHF, orthopnea, PND,  TSANG, murmur, weak pulses,  friction rub, systolic click, carotid bruit,  JVD and peripheral edema    ECG reviewed  Rhythm: regular  Rate: normal           Beta Blocker:  Not on Beta Blocker         Neuro/Psych:   Negative Neuro/Psych ROS     (-) seizures, neuromuscular disease, TIA, CVA, headaches, psychiatric history and depression/anxiety            GI/Hepatic/Renal: Neg GI/Hepatic/Renal ROS       (-) hiatal hernia, GERD, PUD, hepatitis, liver disease, no renal disease, bowel prep and no morbid obesity       Endo/Other:    (+) hypothyroidism::., no malignancy/cancer. (-) diabetes mellitus, hyperthyroidism, blood dyscrasia, arthritis, no electrolyte abnormalities, no malignancy/cancer               Abdominal:           Vascular: negative vascular ROS. - PVD, DVT and PE. Anesthesia Plan      general     ASA 2       Induction: intravenous.     MIPS: Postoperative opioids

## 2020-07-14 NOTE — INTERVAL H&P NOTE
Update History & Physical    The patient's History and Physical of June 30, 2020 was reviewed with the patient and I examined the patient. There was no change. Here today for right knee arthroscopy partial medial meniscectomy. NPO. Took tylenol at 0200 and tramadol at 0500  this am with sip of water. No blood thinners in last 7 days. Denies current chest pain/pressure, palpitations, SOB, recent URI, nausea, vomiting, diarrhea, constipation, fever or chills. Vitals reviewed. Pt normotensive, afebrile.      Electronically signed by ANGELLA Elaine CNP on 7/14/2020 at 10:33 AM

## 2020-07-14 NOTE — ANESTHESIA POSTPROCEDURE EVALUATION
Department of Anesthesiology  Postprocedure Note    Patient: Imani Monique  MRN: [id number]  YOB: 1965  Date of evaluation: 7/14/2020  Time:  1:27 PM     Procedure Summary     Date:  07/14/20 Room / Location:  [address] [place] [address]  / [facility]     Anesthesia Start:  6609 Anesthesia Stop:  4812    Procedure:  KNEE ARTHROSCOPY PARTIAL MEDIAL MENISECECTOMY (Right Knee) Diagnosis:  (MENISCUS TEAR)    Surgeon:  Katina Gordillo MD Responsible Provider:  Darinel Phan MD    Anesthesia Type:  general ASA Status:  2          Anesthesia Type: general    Daksha Phase I:      Daksha Phase II:      Last vitals: Reviewed and per EMR flowsheets.        Anesthesia Post Evaluation

## 2020-07-14 NOTE — ANESTHESIA POSTPROCEDURE EVALUATION
Department of Anesthesiology  Postprocedure Note    Patient: Mariella Myles  MRN: [id number]  YOB: 1965  Date of evaluation: 7/14/2020  Time:  2:59 PM     Procedure Summary     Date:  07/14/20 Room / Location:  [address] [place] [address] / [id number]  [facility]    Anesthesia Start:  1086 Anesthesia Stop:  6632    Procedure:  KNEE ARTHROSCOPY PARTIAL MEDIAL MENISECECTOMY (Right Knee) Diagnosis:  (MENISCUS TEAR)    Surgeon:  Cristal Oneil MD Responsible Provider:  Jennifer Nathan MD    Anesthesia Type:  general ASA Status:  2          Anesthesia Type: general    Daksha Phase I: Daksha Score: 10    Daksha Phase II:      Last vitals: Reviewed and per EMR flowsheets.        Anesthesia Post Evaluation    Comments: POST- ANESTHESIA EVALUATION       Pt Name: Mariella Myles  MRN: [id number]  YOB: 1965  Date of evaluation: 7/14/2020  Time:  2:59 PM      BP (!) 143/85   Pulse 80   Temp 97.7 °F (36.5 °C) (Temporal)   Resp 16   Ht 5' 7\" (1.702 m)   Wt 253 lb (114.8 kg)   SpO2 99%   BMI 39.63 kg/m²      Consciousness Level  Awake  Cardiopulmonary Status  Stable  Pain Adequately Treated YES  Nausea / Vomiting  NO  Adequate Hydration  YES  Anesthesia Related Complications NONE      Electronically signed by Duy Guthrie MD on 7/14/2020 at 2:59 PM

## 2020-07-14 NOTE — OP NOTE
arthritic with exposed bone throughout most of the lateral facet as well as trochlear groove of the patellofemoral joint. . The scope was then passes down the medial gutter into the medial compartment. A probe was used to assess the medial meniscus and a tear was identified in the posterior horn and body of the medial meniscus. A partial medial menisectomy was carried ou with basket forceps and then smoothed out with a shaver. Repeat probing of the meniscus found it to be stable. There significant overlying chondromalacia with loose chondral flaps from the femoral condyle. These were debrided with a shaver to a stable rim. The arthroscope was then passed into the notch of the knee. The ACL was found not to have any significant damage or laxity. The scope was then passed in the lateral compartment. Thorough probing of the lateral meniscus and the articular cartilage did not demonstrate evidence for any significant pathology other than some degenerative fibrillation of the free edges of the meniscus as well as articular surface but again nothing requiring intervention. The scope was then passed into the suprapatellar area and the shaver was used to remove any further soft tissue debris. The scope was removed and the knee evacuated of fluid and injected with 20cc of 0.5% ropivacaine. The sterile bulky dressing was applied and the leg then wrapped with a large 6-inch ace bandage from toes to the mid-thigh. The tourniquet was then deflated at less than 30 minutes. The patient was awaken and taken to the PACU in good condition.      Electronically signed by Abby Walls MD on 7/14/2020 at 1:03 PM

## 2020-07-20 RX ORDER — HYDROCODONE BITARTRATE AND ACETAMINOPHEN 5; 325 MG/1; MG/1
1 TABLET ORAL EVERY 6 HOURS PRN
Qty: 20 TABLET | Refills: 0 | OUTPATIENT
Start: 2020-07-20 | End: 2020-07-27

## 2020-07-20 NOTE — TELEPHONE ENCOUNTER
Pt had Knee scope and would like refill on Norco.   LRF 7/14/2020 #20. If you won't do Norco she would like at least tramadol. She said anything to knock some of the pain off.

## 2020-07-22 RX ORDER — TRAMADOL HYDROCHLORIDE 50 MG/1
50 TABLET ORAL EVERY 6 HOURS PRN
Qty: 40 TABLET | Refills: 0 | Status: SHIPPED | OUTPATIENT
Start: 2020-07-22 | End: 2020-08-01

## 2020-07-24 ENCOUNTER — OFFICE VISIT (OUTPATIENT)
Dept: ORTHOPEDIC SURGERY | Age: 55
End: 2020-07-24
Payer: COMMERCIAL

## 2020-07-24 PROCEDURE — 20610 DRAIN/INJ JOINT/BURSA W/O US: CPT | Performed by: ORTHOPAEDIC SURGERY

## 2020-07-24 PROCEDURE — 99024 POSTOP FOLLOW-UP VISIT: CPT | Performed by: ORTHOPAEDIC SURGERY

## 2020-07-24 RX ORDER — BUPIVACAINE HYDROCHLORIDE 5 MG/ML
2 INJECTION, SOLUTION PERINEURAL ONCE
Status: COMPLETED | OUTPATIENT
Start: 2020-07-24 | End: 2020-07-24

## 2020-07-24 RX ORDER — BETAMETHASONE SODIUM PHOSPHATE AND BETAMETHASONE ACETATE 3; 3 MG/ML; MG/ML
12 INJECTION, SUSPENSION INTRA-ARTICULAR; INTRALESIONAL; INTRAMUSCULAR; SOFT TISSUE ONCE
Status: COMPLETED | OUTPATIENT
Start: 2020-07-24 | End: 2020-07-24

## 2020-07-24 RX ORDER — LIDOCAINE HYDROCHLORIDE 10 MG/ML
2 INJECTION, SOLUTION INFILTRATION; PERINEURAL ONCE
Status: COMPLETED | OUTPATIENT
Start: 2020-07-24 | End: 2020-07-24

## 2020-07-24 RX ADMIN — BUPIVACAINE HYDROCHLORIDE 10 MG: 5 INJECTION, SOLUTION PERINEURAL at 09:20

## 2020-07-24 RX ADMIN — LIDOCAINE HYDROCHLORIDE 2 ML: 10 INJECTION, SOLUTION INFILTRATION; PERINEURAL at 09:19

## 2020-07-24 RX ADMIN — BETAMETHASONE SODIUM PHOSPHATE AND BETAMETHASONE ACETATE 12 MG: 3; 3 INJECTION, SUSPENSION INTRA-ARTICULAR; INTRALESIONAL; INTRAMUSCULAR; SOFT TISSUE at 09:16

## 2020-07-24 NOTE — PROGRESS NOTES
Wilfrid Ozuna M.D.            [address] [facility] [address] [facility],[address], [address] [facility]           Dept Phone: [phone]           Dept Fax:  [phone]           [place]          Dept Phone: [phone]           Dept Fax:  [phone]      Chief Compliant:  Chief Complaint   Patient presents with   [address] knee scope 7/14/20        History of Present Illness:  Patient returns today status post right knee arthroscopy with partial (medial) meniscectomy. Patient has no major complaints other than expected tightness/swelling with ROM. Sharp/stabbing pain has improved. Review of Systems   Constitutional: Negative for fever, chills, sweats. Neurological: Negative for headache, numbness, or weakness. Integumentary: Negative for rash, itching, laceration, or abrasion. Musculoskeletal: Positive for Post-Op Check (Rt knee scope 7/14/20)       Physical Exam:  Constitutional: Patient is oriented to person, place, and time. Patient appears well-developed and well nourished. HENT: Negative otherwise noted  Head: Normocephalic and Atraumatic  Nose: Normal  Eyes: Conjunctivae and EOM are normal  Neck: Normal range of motion Neck supple. Respiratory/Cardio: Effort normal. No respiratory distress. Musculoskeletal: On exam, portal sites are without redness or drainage. No calf tenderness; negative Alexis's sign. Motion is 0-100 degrees. Very significant effusion of the right knee. Neurological: Patient is alert and oriented to person, place, and time. Normal strenght. No sensory deficit. Skin: Skin is warm and dry  Psychiatric: Behavior is normal. Thought content normal.  Nursing note and vitals reviewed. Labs and Imaging:     None taken today.           Orders Placed This Encounter   Procedures    W[place] ARTHROCENTESIS ASPIR&/INJ MAJOR JT/BURSA W/O US       Assessment and Plan:  1. S/P arthroscopy of right knee        Administrations This Visit     betamethasone acetate-betamethasone sodium phosphate (CELESTONE) injection 12 mg     Admin Date  07/24/2020  09:16 Action  Given Dose  12 mg Route  Intra-articular Site  Knee Right Administered By  Gwen Swanson MA    Ordering Provider:  Zhane Henry MD    NDC:  8447-9042-77    Lot#:  [id number]    :  [id number] [facility]    Patient Supplied?:  No          bupivacaine (MARCAINE) 0.5 % injection 10 mg     Admin Date  07/24/2020  09:20 Action  Given Dose  10 mg Route  Intra-articular Site  Knee Right Administered By  Gwen Swanson MA    Ordering Provider:  Zhane Henry MD    NDC:  7456-3803-85    Lot#:  [id number]    :  [employer]    Patient Supplied?:  No          lidocaine 1 % injection 2 mL     Admin Date  07/24/2020  09:19 Action  Given Dose  2 mL Route  Intra-articular Site  Knee Right Administered By  Gwen Swanson MA    Ordering Provider:  Zhane Henry MD    [address]:  2581-3085-10    Lot#:  [id number]    :  Tarik Levans    Patient Supplied?:  No            Status post right knee arthroscopy. Patient given exercises to perform. Patient has very large amount of effusion to her right knee so we will aspirate. Under sterile conditions, Patient's right knee was injected with 2ml of Lidocaine, 2ml of Marcaine, and 2ml of celestone. Aspirated 10 cc of fluid. Patient tolerated the procedure well. RTO PRN. Call with any future problems. Past History:    Current Outpatient Medications:     traMADol (ULTRAM) 50 MG tablet, Take 1 tablet by mouth every 6 hours as needed for Pain for up to 10 days. Intended supply: 5 days. Take lowest dose possible to manage pain, Disp: 40 tablet, Rfl: 0    acetaminophen (TYLENOL) 500 MG tablet, Take 1,000 mg by mouth every 6 hours as needed for Pain, Disp: , Rfl:     ALPRAZolam (XANAX) 0.5 MG tablet, Take by mouth as needed. , Disp: , Rfl:    levothyroxine (SYNTHROID) 75 MCG tablet, Take 1 tablet by mouth daily, Disp: 90 tablet, Rfl: 3    losartan (COZAAR) 100 MG tablet, Take 1 tablet by mouth daily (Patient taking differently: Take 100 mg by mouth nightly ), Disp: 90 tablet, Rfl: 3    PARoxetine (PAXIL) 20 MG tablet, Take 1 tablet by mouth every morning Indications: 1 qd, Disp: 90 tablet, Rfl: 3  Allergies   Allergen Reactions    Pcn [Penicillins]     Penicillins Other (See Comments)     Pt says mom said PT passed out     Social History     Socioeconomic History    Marital status:      Spouse name: Not on file    Number of children: Not on file    Years of education: Not on file    Highest education level: Not on file   Occupational History    Not on file   Social Needs    Financial resource strain: Not on file    Food insecurity     Worry: Not on file     Inability: Not on file    Transportation needs     Medical: Not on file     Non-medical: Not on file   Tobacco Use    Smoking status: Former Smoker     Last attempt to quit: 1992     Years since quittin.0    Smokeless tobacco: Never Used   Substance and Sexual Activity    Alcohol use: No    Drug use: No    Sexual activity: Not on file   Lifestyle    Physical activity     Days per week: Not on file     Minutes per session: Not on file    Stress: Not on file   Relationships    Social connections     Talks on phone: Not on file     Gets together: Not on file     Attends Baptism service: Not on file     Active member of club or organization: Not on file     Attends meetings of clubs or organizations: Not on file     Relationship status: Not on file    Intimate partner violence     Fear of current or ex partner: Not on file     Emotionally abused: Not on file     Physically abused: Not on file     Forced sexual activity: Not on file   Other Topics Concern    Not on file   Social History Narrative    Not on file     Past Medical History:   Diagnosis Date    Anxiety  Hypertension     Hypothyroid     Hypothyroid      Past Surgical History:   Procedure Laterality Date    COLONOSCOPY      KNEE ARTHROSCOPY Right 7/14/2020    KNEE ARTHROSCOPY PARTIAL MEDIAL MENISECECTOMY performed by Ashley Haddad MD at [address] EXTRACTION       Family History   Problem Relation Age of Onset    Diabetes Mother     Cancer Mother     Heart Disease Father         Scribe Attestation:  By signing my name below, I, Jairon Romero, attest that this documentation has been prepared under the direction and in the presence of Dr. Odilon Fajardo. Electronically signed: Leif Ly, 7/24/20     Please note that this chart was generated using voice recognition Dragon dictation software. Although every effort was made to ensure the accuracy of this automated transcription, some errors in transcription may have occurred.

## 2020-08-18 RX ORDER — METHYLPREDNISOLONE 4 MG/1
TABLET ORAL
Qty: 1 KIT | Refills: 0 | Status: SHIPPED | OUTPATIENT
Start: 2020-08-18

## 2020-08-19 NOTE — TELEPHONE ENCOUNTER
Called patient and let her know script was sent to pharmacy and that Dr. Ang Cedillo approved 2 more weeks off work. She is going to have her daughter take the [employer] paperwork over to [employer], so it can be filled for the extension.

## 2020-10-13 ENCOUNTER — TELEPHONE (OUTPATIENT)
Dept: ORTHOPEDIC SURGERY | Age: 55
End: 2020-10-13

## 2020-10-13 NOTE — TELEPHONE ENCOUNTER
Patient had Right knee Medial Meniscus tear repair on 7/14/20. She is back to work and having some swelling at the knee and some ankle swelling. Having pain with some movements, she is wearing her brace and doing her exercises. Charles Kaminski gave her an injection on 7/24/20 of Celestone and you gave her MDP on 8/18/20. She is concern about the length of time since the surgery and feeling that her knee is not healing as it should. Any other suggestions?     If you decide to give her medication, she would like it to go to [facility] in [facility]

## 2020-10-14 ENCOUNTER — OFFICE VISIT (OUTPATIENT)
Dept: ORTHOPEDIC SURGERY | Age: 55
End: 2020-10-14
Payer: COMMERCIAL

## 2020-10-14 PROCEDURE — 20610 DRAIN/INJ JOINT/BURSA W/O US: CPT | Performed by: ORTHOPAEDIC SURGERY

## 2020-10-14 PROCEDURE — 99024 POSTOP FOLLOW-UP VISIT: CPT | Performed by: ORTHOPAEDIC SURGERY

## 2020-10-14 RX ORDER — TRIAMCINOLONE ACETONIDE 40 MG/ML
40 INJECTION, SUSPENSION INTRA-ARTICULAR; INTRAMUSCULAR ONCE
Status: COMPLETED | OUTPATIENT
Start: 2020-10-14 | End: 2020-10-14

## 2020-10-14 RX ORDER — BUPIVACAINE HYDROCHLORIDE 5 MG/ML
2 INJECTION, SOLUTION PERINEURAL ONCE
Status: COMPLETED | OUTPATIENT
Start: 2020-10-14 | End: 2020-10-14

## 2020-10-14 RX ORDER — LIDOCAINE HYDROCHLORIDE 10 MG/ML
2 INJECTION, SOLUTION EPIDURAL; INFILTRATION; INTRACAUDAL; PERINEURAL ONCE
Status: COMPLETED | OUTPATIENT
Start: 2020-10-14 | End: 2020-10-14

## 2020-10-14 RX ORDER — MELOXICAM 15 MG/1
15 TABLET ORAL DAILY
Qty: 30 TABLET | Refills: 3 | Status: SHIPPED | OUTPATIENT
Start: 2020-10-14

## 2020-10-14 RX ADMIN — TRIAMCINOLONE ACETONIDE 40 MG: 40 INJECTION, SUSPENSION INTRA-ARTICULAR; INTRAMUSCULAR at 16:36

## 2020-10-14 RX ADMIN — LIDOCAINE HYDROCHLORIDE 2 ML: 10 INJECTION, SOLUTION EPIDURAL; INFILTRATION; INTRACAUDAL; PERINEURAL at 16:36

## 2020-10-14 RX ADMIN — BUPIVACAINE HYDROCHLORIDE 10 MG: 5 INJECTION, SOLUTION PERINEURAL at 16:35

## 2020-10-14 NOTE — PROGRESS NOTES
patient does have some early arthritis I think it would be Hicks to start her on some Mobic and this was called in for her. We will see her back here as needed      Provider Attestation:  Debbie Knott, personally performed the services described in this documentation. All medical record entries made by the scribe were at my direction and in my presence. I have reviewed the chart and discharge instructions and agree that the records reflect my personal performance and is accurate and complete. Lan Rios MD. 10/14/20      Please note that this chart was generated using voice recognition Dragon dictation software. Although every effort was made to ensure the accuracy of this automated transcription, some errors in transcription may have occurred.

## 2020-11-18 RX ORDER — METHYLPREDNISOLONE 4 MG/1
TABLET ORAL
Qty: 1 KIT | Refills: 0 | Status: SHIPPED | OUTPATIENT
Start: 2020-11-18

## 2020-12-09 ENCOUNTER — TELEPHONE (OUTPATIENT)
Dept: ORTHOPEDIC SURGERY | Age: 55
End: 2020-12-09

## 2020-12-09 NOTE — TELEPHONE ENCOUNTER
Right knee scope on 7-14-20. She works for the [employer] and had a letter stating she should be allowed to sit as much as possible at work. She would like this extended. If ok, please fax new letter to [employer] at [phone]. Her release is scanned in.

## 2020-12-14 NOTE — TELEPHONE ENCOUNTER
Called patient and let her know Dr. Parson Quant response. She will try to it and see how it goes and call us back if unable to stand all day.

## 2022-08-26 ENCOUNTER — OFFICE VISIT (OUTPATIENT)
Dept: ORTHOPEDIC SURGERY | Age: 57
End: 2022-08-26
Payer: COMMERCIAL

## 2022-08-26 DIAGNOSIS — M25.562 LEFT KNEE PAIN, UNSPECIFIED CHRONICITY: Primary | ICD-10-CM

## 2022-08-26 PROCEDURE — 3017F COLORECTAL CA SCREEN DOC REV: CPT | Performed by: ORTHOPAEDIC SURGERY

## 2022-08-26 PROCEDURE — G8421 BMI NOT CALCULATED: HCPCS | Performed by: ORTHOPAEDIC SURGERY

## 2022-08-26 PROCEDURE — 99213 OFFICE O/P EST LOW 20 MIN: CPT | Performed by: ORTHOPAEDIC SURGERY

## 2022-08-26 PROCEDURE — G8428 CUR MEDS NOT DOCUMENT: HCPCS | Performed by: ORTHOPAEDIC SURGERY

## 2022-08-26 PROCEDURE — 4004F PT TOBACCO SCREEN RCVD TLK: CPT | Performed by: ORTHOPAEDIC SURGERY

## 2023-05-12 NOTE — TELEPHONE ENCOUNTER
Patient called to request a phone call from clinical staff or from [facility] regarding her MRI results. She states that she was told she would be contacted within 24 hrs with her MRI results and she hasn't received a call. She would like Domitila Russell to contact her as soon as possible so she can know what exactly is going on with her knee. Please advise. Please see the attached refill request.

## 2023-09-13 ENCOUNTER — OFFICE VISIT (OUTPATIENT)
Dept: ORTHOPEDIC SURGERY | Age: 58
End: 2023-09-13
Payer: COMMERCIAL

## 2023-09-13 VITALS — WEIGHT: 233.44 LBS | RESPIRATION RATE: 14 BRPM | BODY MASS INDEX: 36.64 KG/M2 | HEIGHT: 67 IN

## 2023-09-13 DIAGNOSIS — M25.562 ACUTE PAIN OF LEFT KNEE: Primary | ICD-10-CM

## 2023-09-13 DIAGNOSIS — M17.12 PRIMARY OSTEOARTHRITIS OF LEFT KNEE: ICD-10-CM

## 2023-09-13 DIAGNOSIS — S83.412A SPRAIN OF MEDIAL COLLATERAL LIGAMENT OF LEFT KNEE, INITIAL ENCOUNTER: ICD-10-CM

## 2023-09-13 PROCEDURE — 3017F COLORECTAL CA SCREEN DOC REV: CPT | Performed by: PHYSICIAN ASSISTANT

## 2023-09-13 PROCEDURE — G8427 DOCREV CUR MEDS BY ELIG CLIN: HCPCS | Performed by: PHYSICIAN ASSISTANT

## 2023-09-13 PROCEDURE — 99214 OFFICE O/P EST MOD 30 MIN: CPT | Performed by: PHYSICIAN ASSISTANT

## 2023-09-13 PROCEDURE — G8419 CALC BMI OUT NRM PARAM NOF/U: HCPCS | Performed by: PHYSICIAN ASSISTANT

## 2023-09-13 PROCEDURE — 1036F TOBACCO NON-USER: CPT | Performed by: PHYSICIAN ASSISTANT

## 2023-09-13 RX ORDER — MELOXICAM 15 MG/1
15 TABLET ORAL DAILY
Qty: 30 TABLET | Refills: 0 | Status: SHIPPED | OUTPATIENT
Start: 2023-09-13

## 2023-09-13 NOTE — PROGRESS NOTES
[address], [place], [address] [facility], [address] [place] [address] [address]           Dept Phone: [phone]           Dept Fax:  [phone] [address]          Dept Phone: [phone]           Dept Fax:  [phone]      Chief Compliant:  Chief Complaint   Patient presents with    Knee Pain     New issue L Knee        History of Present Illness: This is a 60 y.o. female who presents to the clinic today for evaluation of had concerns including Knee Pain (New issue L Knee). Ms. Cindy Vela is a 48-year-old female who presents for evaluation of 10-day history of left knee pain. Patient does not recall any specific injury or trauma but reports she was doing a a lot of yard work the days leading up to the onset of pain. She reports pain is most severe to the medial aspect seems to be aggravated by prolonged period of standing or walking. She states she does have significant soreness in the knee after getting home from work as well as with prolonged period of driving. Patient has taken occasional ibuprofen. No formal physical therapy or injections. History of right knee arthroscopy partial medial meniscectomy on 7/14/2020.          Past History:    Current Outpatient Medications:     meloxicam (MOBIC) 15 MG tablet, Take 1 tablet by mouth daily, Disp: 30 tablet, Rfl: 0    methylPREDNISolone (MEDROL DOSEPACK) 4 MG tablet, Take by mouth as directed on package (Patient not taking: Reported on 9/13/2023), Disp: 1 kit, Rfl: 0    meloxicam (MOBIC) 15 MG tablet, Take 1 tablet by mouth daily (Patient not taking: Reported on 9/13/2023), Disp: 30 tablet, Rfl: 3    methylPREDNISolone (MEDROL DOSEPACK) 4 MG tablet, Take by mouth as directed on package (Patient not taking: Reported on 9/13/2023), Disp: 1 kit, Rfl: 0    acetaminophen (TYLENOL) 500

## 2023-11-27 NOTE — PROGRESS NOTES
Julissa Rea M.D.            [facility] [address] [facility],[address], [address] [facility]           Dept Phone: [phone]           Dept Fax:  [phone]           [place]          Dept Phone: [phone]           Dept Fax:  [phone]      Chief Compliant:  Chief Complaint   Patient presents with    Pain     Lt knee        History of Present Illness: This is a 61 y.o. female who presents to the clinic today for evaluation / follow up of left knee pain. Patient states approximate 4 weeks ago she was pulling beck and again when she went tumbling back however she did not fall down. She states that she has been having some posterior knee pain that has slightly gotten better over time basically here just to verify she did have a meniscus tear which she encountered on her contralateral right knee. .       Review of Systems   Constitutional: Negative for fever, chills, sweats. Eyes: Negative for changes in vision, or pain. HENT: Negative for ear ache, epistaxis, or sore throat. Respiratory/Cardio: Negative for Chest pain, palpitations, SOB, or cough. Gastrointestinal: Negative for abdominal pain, N/V/D. Genitourinary: Negative for dysuria, frequency, urgency, or hematuria. Neurological: Negative for headache, numbness, or weakness. Integumentary: Negative for rash, itching, laceration, or abrasion. Musculoskeletal: Positive for Pain (Lt knee)       Physical Exam:  Constitutional: Patient is oriented to person, place, and time. Patient appears well-developed and well nourished. HENT: Negative otherwise noted  Head: Normocephalic and Atraumatic  Nose: Normal  Eyes: Conjunctivae and EOM are normal  Neck: Normal range of motion Neck supple. Respiratory/Cardio: Effort normal. No respiratory distress.   Musculoskeletal: Examination of her left knee notes no obvious effusion. Knee motion is 0 to 130 degrees. Shelly's is negative Lachman's negative collaterals are negative no pes tenderness no IT band tenderness no hip rotational pain no patellofemoral pain or apprehension no calf tenderness negative Homans    Neurological: Patient is alert and oriented to person, place, and time. Normal strength. No sensory deficit. Skin: Skin is warm and dry  Psychiatric: Behavior is normal. Thought content normal.  Nursing note and vitals reviewed. Labs and Imaging:     XR taken today:  XR KNEE LEFT (1-2 VIEWS)    Result Date: 8/26/2022  X-rays taken today reviewed by me show standing AP both knees and lateral left knee. Patient has some modest to moderate medial and lateral joint space narrowing as seen on AP view. Lateral view shows some patellofemoral narrowing as well this is all consistent with moderate degenerative joint disease no acute process is noted however of either knee. Orders Placed This Encounter   Procedures    XR KNEE LEFT (1-2 VIEWS)     Standing Status:   Future     Number of Occurrences:   1     Standing Expiration Date:   8/24/2023       Assessment and Plan:  Normal knee examination        This is a 61 y.o. female who presents to the clinic today for evaluation / follow up of normal knee examination. Past History:    Current Outpatient Medications:     methylPREDNISolone (MEDROL DOSEPACK) 4 MG tablet, Take by mouth as directed on package, Disp: 1 kit, Rfl: 0    meloxicam (MOBIC) 15 MG tablet, Take 1 tablet by mouth daily, Disp: 30 tablet, Rfl: 3    methylPREDNISolone (MEDROL DOSEPACK) 4 MG tablet, Take by mouth as directed on package, Disp: 1 kit, Rfl: 0    acetaminophen (TYLENOL) 500 MG tablet, Take 1,000 mg by mouth every 6 hours as needed for Pain, Disp: , Rfl:     ALPRAZolam (XANAX) 0.5 MG tablet, Take by mouth as needed. , Disp: , Rfl:     levothyroxine (SYNTHROID) 75 MCG tablet, Take 1 tablet by mouth daily, Disp: 90 tablet, Rfl: 3    losartan (COZAAR) 100 MG tablet, Take 1 tablet by mouth daily (Patient taking differently: Take 100 mg by mouth nightly ), Disp: 90 tablet, Rfl: 3    PARoxetine (PAXIL) 20 MG tablet, Take 1 tablet by mouth every morning Indications: 1 qd, Disp: 90 tablet, Rfl: 3  Allergies   Allergen Reactions    Pcn [Penicillins]     Penicillins Other (See Comments)     Pt says mom said PT passed out     Social History     Socioeconomic History    Marital status:      Spouse name: Not on file    Number of children: Not on file    Years of education: Not on file    Highest education level: Not on file   Occupational History    Not on file   Tobacco Use    Smoking status: Former     Types: Cigarettes     Quit date: 1992     Years since quittin.1    Smokeless tobacco: Never   Vaping Use    Vaping Use: Never used   Substance and Sexual Activity    Alcohol use: No    Drug use: No    Sexual activity: Not on file   Other Topics Concern    Not on file   Social History Narrative    Not on file     Social Determinants of Health     Financial Resource Strain: Not on file   Food Insecurity: Not on file   Transportation Needs: Not on file   Physical Activity: Not on file   Stress: Not on file   Social Connections: Not on file   Intimate Partner Violence: Not on file   Housing Stability: Not on file     Past Medical History:   Diagnosis Date    Anxiety     Hypertension     Hypothyroid     Hypothyroid      Past Surgical History:   Procedure Laterality Date    COLONOSCOPY      KNEE ARTHROSCOPY Right 2020    KNEE ARTHROSCOPY PARTIAL MEDIAL MENISECECTOMY performed by Chance Green MD at [address]       Family History   Problem Relation Age of Onset    Diabetes Mother     Cancer Mother     Heart Disease Father    Plan  Patient was advised I do not see any obvious that she is going on in her knee and she is happy and relieved to hear this.   She can resume activity as tolerated with or without the knee brace back here as needed      Provider Attestation:  Savana Day, personally performed the services described in this documentation. All medical record entries made by the scribe were at my direction and in my presence. I have reviewed the chart and discharge instructions and agree that the records reflect my personal performance and is accurate and complete. Rosy Field MD. 08/26/22      Please note that this chart was generated using voice recognition Dragon dictation software. Although every effort was made to ensure the accuracy of this automated transcription, some errors in transcription may have occurred. none

## 2024-03-07 ENCOUNTER — OFFICE VISIT (OUTPATIENT)
Dept: ORTHOPEDIC SURGERY | Age: 59
End: 2024-03-07
Payer: COMMERCIAL

## 2024-03-07 VITALS — HEIGHT: 67 IN | WEIGHT: 233 LBS | RESPIRATION RATE: 14 BRPM | BODY MASS INDEX: 36.57 KG/M2

## 2024-03-07 DIAGNOSIS — M17.12 PRIMARY OSTEOARTHRITIS OF LEFT KNEE: Primary | ICD-10-CM

## 2024-03-07 PROCEDURE — 3017F COLORECTAL CA SCREEN DOC REV: CPT | Performed by: PHYSICIAN ASSISTANT

## 2024-03-07 PROCEDURE — G8484 FLU IMMUNIZE NO ADMIN: HCPCS | Performed by: PHYSICIAN ASSISTANT

## 2024-03-07 PROCEDURE — 1036F TOBACCO NON-USER: CPT | Performed by: PHYSICIAN ASSISTANT

## 2024-03-07 PROCEDURE — 99213 OFFICE O/P EST LOW 20 MIN: CPT | Performed by: PHYSICIAN ASSISTANT

## 2024-03-07 PROCEDURE — G8417 CALC BMI ABV UP PARAM F/U: HCPCS | Performed by: PHYSICIAN ASSISTANT

## 2024-03-07 PROCEDURE — G8427 DOCREV CUR MEDS BY ELIG CLIN: HCPCS | Performed by: PHYSICIAN ASSISTANT

## 2024-03-08 NOTE — PROGRESS NOTES
[facility]                Taco Rodriguez PA-C            [address], [address]               [place], [place] [address]           Dept Phone: [phone]           Dept Fax:  [phone] [address] [facility]                       [address]           [place], [place] [address]          Dept Phone: [phone]           Dept Fax:  [phone]      Chief Compliant:  Chief Complaint   Patient presents with    Knee Pain     left        History of Present Illness:  This is a 58 y.o. female who presents to the clinic today for evaluation of had concerns including Knee Pain (left).     Ms. Shaw is a 58-year-old female who returns today for reevaluation of chronic left knee pain.  Patient reports she has had pain for approximately year and a half to this point but she saw me back in September 2023 reporting about a week and a half of increased pain at that time.  Examination was most concerning for MCL sprain with underlying osteoarthrosis.  We elected to proceed with prescription NSAID in the form of meloxicam and home exercise program as well as hinged knee brace.    Patient returns today 6 months later unfortunate continues to note pain especially over the medial aspect.  Patient reports she has had improvement day-to-day but still continues to have a \"sharp pain\" over the medial aspect with certain twisting and turning.  She does feel like the knee will occasionally give way as well.  She denies any new injury or trauma no knee joint warmth, redness, fever or chills.       Past History:    Current Outpatient Medications:     meloxicam (MOBIC) 15 MG tablet, Take 1 tablet by mouth daily, Disp: 30 tablet, Rfl: 0    methylPREDNISolone (MEDROL DOSEPACK) 4 MG tablet, Take by mouth as directed on package (Patient not taking: Reported on 9/13/2023), Disp: 1 kit, Rfl: 0    meloxicam (MOBIC) 15 MG tablet, Take 1 tablet by mouth daily (Patient not taking: Reported on

## (undated) DEVICE — BLADE SHVR ARTHSCP AGRSV TOMCAT 4MM

## (undated) DEVICE — ZIMMER® STERILE DISPOSABLE TOURNIQUET CUFF WITH PLC, DUAL PORT, SINGLE BLADDER, 30 IN. (76 CM)

## (undated) DEVICE — SINGLE PORT MANIFOLD: Brand: NEPTUNE 2

## (undated) DEVICE — MERCY HEALTH ST CHARLES: Brand: MEDLINE INDUSTRIES, INC.

## (undated) DEVICE — GLOVE ORTHO 8   MSG9480

## (undated) DEVICE — GOWN,AURORA,NONREINFORCED,LARGE: Brand: MEDLINE

## (undated) DEVICE — PADDING CAST W6INXL4YD POLY POR SPUN DACRON SYN VERSATILE

## (undated) DEVICE — PACK ARTHRO W PCH

## (undated) DEVICE — DRESSING,GAUZE,XEROFORM,CURAD,1"X8",ST: Brand: CURAD

## (undated) DEVICE — SOLUTION IV IRRIG LACTATED RINGERS 3000ML 2B7487

## (undated) DEVICE — SUTURE ETHLN SZ 3-0 L18IN NONABSORBABLE BLK FS-1 L24MM 3/8 663H

## (undated) DEVICE — [TOMCAT CUTTER, ARTHROSCOPIC SHAVER BLADE,  DO NOT RESTERILIZE,  DO NOT USE IF PACKAGE IS DAMAGED,  KEEP DRY,  KEEP AWAY FROM SUNLIGHT]: Brand: FORMULA